# Patient Record
(demographics unavailable — no encounter records)

---

## 2024-12-24 NOTE — END OF VISIT
[Time Spent: ___ minutes] : I have spent [unfilled] minutes of time on the encounter which excludes teaching and separately reported services. 0 = swallows foods/liquids without difficulty

## 2024-12-24 NOTE — HISTORY OF PRESENT ILLNESS
[FreeTextEntry1] : Mr. Oden is a 45-year-old male with a PMHx of depression, REMY, Pre-diabetes, reported seizures 15 years ago presents today for a follow up visit after presented to the ED with acute headaches after sexual intercourse. Patient states he has never had headaches before. He describes the headache has feeling like something was going to burst out of his head. They start in the back of the head and travel to the top of the head. He stays he was unable to lay down or sit down due to the severe pain. He states they were 10/10 in intensity. Last 1 hour. He states he had sexual intercourse five days in the row while partying in [facility]. He admits to drinking, smoking 1 PPD of cigarettes and smoking marijuana, as well as lack of sleep. He states the headache suddenly came on 10/10 intensity while he was having an orgasm.  He reports he went to the ED when he returned back from [facility] because when he got home, he was having a bowel movement, and the same type of acute headache happened again. He states the headache this time lasted almost 14 hours, so he went to ED. Had CT head done which was unremarkable.  Denies any headache at this time. Denies dizziness, weakness, slurred speech, seizure activity.

## 2024-12-24 NOTE — PHYSICAL EXAM
[FreeTextEntry1] :  Mental status: Awake, alert and oriented x3.  Recent and remote memory intact.  Naming, repetition and comprehension intact.  Attention/concentration intact.  No dysarthria, no aphasia.  Fund of knowledge appropriate.  Cranial nerves: Pupils equally round and reactive to light, visual fields full, no nystagmus, extraocular muscles intact, V1 through V3 intact bilaterally and symmetric, face symmetric, hearing intact to finger rub, palate elevation symmetric, tongue was midline.  Motor:  MRC grading 5/5 b/l UE/LE.   strength 5/5.  Normal tone and bulk.  No abnormal movements.  Sensation: Intact to light touch, proprioception, and pinprick.  Coordination: No dysmetria on finger-to-nose  Reflexes: 2+ in bilateral UE/LE  Gait: Narrow and steady. No ataxia.  Romberg negative

## 2024-12-24 NOTE — ASSESSMENT
[FreeTextEntry1] : Mr. Oden is a 45-year-old male with a PMHx of depression, REMY, Pre-diabetes, reported seizures 15 years ago presents today for a follow up visit for acute headaches after sexual intercourse x 5 days and an acute headache after having a bowel movement which lasted 14 hours. 10/10 in intensity. was found to be hypertensive in the ED at 220/110. Today patient BP has improved but still hypertensive.  Due to the patient having acute onset headaches which are new and has never happened before, would like to order MR head to r/o any structural causes of acute headaches.  Due to the patient being severely hypertensive in ED and today BP is high with no known hx of HTN, would like to order CTA Head and Neck to r/o any stenosis, aneurysm or malformation that can be causing headaches.   Plan: MR head w/o CTA H w & w/o  CTA N w & w/o 48 h aEEG cardiology referral  pulmonary referral  Educated on the importance of smoking cessation, stopping any illicit drug use, limited alcohol intake and focusing on better healthy patterns such as adequate hydration, healthy diet, and exercise as well as better sleep patterns. RTC 3 months